# Patient Record
Sex: FEMALE | Race: WHITE
[De-identification: names, ages, dates, MRNs, and addresses within clinical notes are randomized per-mention and may not be internally consistent; named-entity substitution may affect disease eponyms.]

---

## 2024-08-23 ENCOUNTER — NON-APPOINTMENT (OUTPATIENT)
Age: 54
End: 2024-08-23

## 2024-08-23 ENCOUNTER — TRANSCRIPTION ENCOUNTER (OUTPATIENT)
Age: 54
End: 2024-08-23

## 2024-08-23 ENCOUNTER — APPOINTMENT (OUTPATIENT)
Dept: CARDIOLOGY | Facility: CLINIC | Age: 54
End: 2024-08-23
Payer: COMMERCIAL

## 2024-08-23 VITALS
HEART RATE: 84 BPM | SYSTOLIC BLOOD PRESSURE: 112 MMHG | BODY MASS INDEX: 35.7 KG/M2 | HEIGHT: 62 IN | DIASTOLIC BLOOD PRESSURE: 72 MMHG | OXYGEN SATURATION: 96 % | WEIGHT: 194 LBS

## 2024-08-23 DIAGNOSIS — B55.1: ICD-10-CM

## 2024-08-23 DIAGNOSIS — I10 ESSENTIAL (PRIMARY) HYPERTENSION: ICD-10-CM

## 2024-08-23 DIAGNOSIS — J35.1 HYPERTROPHY OF TONSILS: ICD-10-CM

## 2024-08-23 DIAGNOSIS — R06.09 OTHER FORMS OF DYSPNEA: ICD-10-CM

## 2024-08-23 DIAGNOSIS — E78.5 HYPERLIPIDEMIA, UNSPECIFIED: ICD-10-CM

## 2024-08-23 DIAGNOSIS — K76.9 LIVER DISEASE, UNSPECIFIED: ICD-10-CM

## 2024-08-23 PROCEDURE — 99204 OFFICE O/P NEW MOD 45 MIN: CPT

## 2024-08-23 PROCEDURE — 99214 OFFICE O/P EST MOD 30 MIN: CPT

## 2024-08-23 PROCEDURE — 93000 ELECTROCARDIOGRAM COMPLETE: CPT

## 2024-08-23 RX ORDER — URSODIOL 250 MG/1
250 TABLET ORAL DAILY
Refills: 0 | Status: ACTIVE | COMMUNITY

## 2024-08-23 RX ORDER — VENLAFAXINE HYDROCHLORIDE 75 MG/1
75 CAPSULE, EXTENDED RELEASE ORAL DAILY
Refills: 0 | Status: ACTIVE | COMMUNITY

## 2024-08-23 RX ORDER — AMLODIPINE AND OLMESARTAN MEDOXOMIL 10; 20 MG/1; MG/1
10-20 TABLET ORAL DAILY
Refills: 0 | Status: ACTIVE | COMMUNITY

## 2024-08-23 RX ORDER — METHYLPHENIDATE HYDROCHLORIDE 36 MG/1
36 TABLET, EXTENDED RELEASE ORAL DAILY
Refills: 0 | Status: ACTIVE | COMMUNITY

## 2024-08-23 RX ORDER — GUANFACINE 2 MG/1
2 TABLET, EXTENDED RELEASE ORAL DAILY
Refills: 0 | Status: ACTIVE | COMMUNITY

## 2024-08-23 RX ORDER — NALTREXONE HYDROCHLORIDE 50 MG/1
50 TABLET, FILM COATED ORAL DAILY
Refills: 0 | Status: ACTIVE | COMMUNITY

## 2024-08-23 RX ORDER — UBIDECARENONE/VIT E ACET 100MG-5
CAPSULE ORAL DAILY
Refills: 0 | Status: ACTIVE | COMMUNITY

## 2024-08-27 NOTE — HISTORY OF PRESENT ILLNESS
[FreeTextEntry1] : Patient very pleasant 54-year-old female known to me since age 15.  Initially she was seen for amarilys valve l prolapse and she is moved away and over the years has developed hypertension diagnosed in 2010 placed on amlodipine and olmesartan, she is known marked hyperlipidemia, and March blood work was notable for an LDL of 151 total cholesterol of 266 HDL of 97 triglycerides of 92.  She was placed on Huey recommended to initiate Crestor because of her liver disease that was not implemented.  Patient chronically has ADHD and is on Ritalin she has developed progressive liver disease is unclear etiology she had a biopsy 2 years ago and no pathology was found a FibroScan and MRI are pending.  She presents today concerned over new and progressive dyspnea on exertion despite the fact that she is lost some weight.  She also notes chronic fatigue wishes to have these issues evaluated as there is a strong family history of mother having CAD and bypass surgery in her 30s.

## 2024-08-27 NOTE — PHYSICAL EXAM
[Well Developed] : well developed [Well Nourished] : well nourished [No Acute Distress] : no acute distress [No Carotid Bruit] : no carotid bruit [Normal S1, S2] : normal S1, S2 [No Rub] : no rub [Clear Lung Fields] : clear lung fields [Good Air Entry] : good air entry [Normal] : normal gait [No Edema] : no edema [No Cyanosis] : no cyanosis [No Clubbing] : no clubbing [No Focal Deficits] : no focal deficits [de-identified] : Mildly overweight [de-identified] : No palpable thyroid [de-identified] : Soft midsystolic click and grade 1/6 to 2/6 apical MR murmur

## 2024-08-27 NOTE — CARDIOLOGY SUMMARY
[de-identified] : (8/23/2024) EKG: NSR at 77 beats minute with a frontal QRS axis of +15 degrees.  Borderline poor R progression otherwise normal trace.

## 2024-08-27 NOTE — REASON FOR VISIT
[FreeTextEntry1] : Patient is a 54-year-old female well-known to my practice since age 15 when she was diagnosed with mitral valve prolapse and having palpitations.  Over the years she has developed hypertension and has had significant weight gain as well as progressive liver disease from unclear etiology.  She presents to the office today with complaints of significant dyspnea on exertion with minimal exertion despite recent weight loss.  Patient otherwise presents looking and feeling well.  She is without any active anginal symptoms, she is postmenopausal but not clear that she has she has an IUD.  She notes that she has been losing weight and most recently while crossing a parking lot developed reproducible predictable dyspnea on exertion without anginal symptoms.  She has had an echocardiogram this past November that revealed mild LVH with a preserved ejection fraction and she presents today for recommendations and further evaluations, she is taking and tolerating all of her medications and blood pressure is acceptable at this time.

## 2024-08-27 NOTE — CARDIOLOGY SUMMARY
[de-identified] : (8/23/2024) EKG: NSR at 77 beats minute with a frontal QRS axis of +15 degrees.  Borderline poor R progression otherwise normal trace.

## 2024-08-27 NOTE — PHYSICAL EXAM
[Well Developed] : well developed [Well Nourished] : well nourished [No Acute Distress] : no acute distress [No Carotid Bruit] : no carotid bruit [Normal S1, S2] : normal S1, S2 [No Rub] : no rub [Clear Lung Fields] : clear lung fields [Good Air Entry] : good air entry [Normal] : normal gait [No Edema] : no edema [No Cyanosis] : no cyanosis [No Clubbing] : no clubbing [No Focal Deficits] : no focal deficits [de-identified] : Mildly overweight [de-identified] : No palpable thyroid [de-identified] : Soft midsystolic click and grade 1/6 to 2/6 apical MR murmur

## 2024-08-27 NOTE — REVIEW OF SYSTEMS
[Feeling Fatigued] : feeling fatigued [Weight Loss (___ Lbs)] : [unfilled] ~Ulb weight loss [SOB] : shortness of breath [Dyspnea on exertion] : dyspnea during exertion [Chest Discomfort] : no chest discomfort [Lower Ext Edema] : no extremity edema [Leg Claudication] : no intermittent leg claudication [Palpitations] : palpitations [Orthopnea] : no orthopnea [PND] : no PND [Syncope] : no syncope [Negative] : Neurological

## 2024-08-27 NOTE — DISCUSSION/SUMMARY
[FreeTextEntry1] : Patient very pleasant 54-year-old female well-known to me for the last nearly 40 years who has known mitral valve prolapse, strong family history of CAD, and undiagnosed type of liver disease that is not proven itself to be progressive.  She is currently awaiting a FibroScan and MRI as a biopsy was unrevealing.  Patient is chronically on Ritalin for ADHD and Huey.  She is taking and tolerating her antihypertensive regimen and her blood pressure is ideal.  She has recently lost a little bit of weight and has noticed significant and progressive dyspnea on exertion.  She had a recent workup this past November including echocardiography that was questionable for LVH with an EF of 55%.  Patient was recommended return back to the office for stress testing and likely will need to repeat her echocardiography if the stress test is unrevealing.  There is a strong family history of CAD and her lipids are less than ideal, she is unable to take Crestor because of her liver issues.  No additional changes were made to her medical regimen at this point in time.  She will return in the next several weeks for stress testing further management will be dependent on those findings.  A full set of updated laboratory data was recommended to be and a prescription issued.  Joel Goldberg, MD, FACC [EKG obtained to assist in diagnosis and management of assessed problem(s)] : EKG obtained to assist in diagnosis and management of assessed problem(s)

## 2024-09-12 ENCOUNTER — APPOINTMENT (OUTPATIENT)
Dept: CARDIOLOGY | Facility: CLINIC | Age: 54
End: 2024-09-12
Payer: COMMERCIAL

## 2024-09-12 VITALS
BODY MASS INDEX: 36.4 KG/M2 | HEART RATE: 99 BPM | DIASTOLIC BLOOD PRESSURE: 72 MMHG | WEIGHT: 199 LBS | SYSTOLIC BLOOD PRESSURE: 114 MMHG | OXYGEN SATURATION: 98 %

## 2024-09-12 DIAGNOSIS — K76.9 LIVER DISEASE, UNSPECIFIED: ICD-10-CM

## 2024-09-12 DIAGNOSIS — R06.09 OTHER FORMS OF DYSPNEA: ICD-10-CM

## 2024-09-12 DIAGNOSIS — I10 ESSENTIAL (PRIMARY) HYPERTENSION: ICD-10-CM

## 2024-09-12 DIAGNOSIS — E78.5 HYPERLIPIDEMIA, UNSPECIFIED: ICD-10-CM

## 2024-09-12 PROCEDURE — 93351 STRESS TTE COMPLETE: CPT

## 2024-09-12 PROCEDURE — 99213 OFFICE O/P EST LOW 20 MIN: CPT

## 2024-09-12 PROCEDURE — 93320 DOPPLER ECHO COMPLETE: CPT

## 2024-09-12 NOTE — REASON FOR VISIT
[FreeTextEntry1] : Patient is a 54-year-old female who presents the office today to review her stress echocardiogram performed for complaints of profound dyspnea on exertion which she described at her initial visit to the office here on August 23, 2024.  Patient is well-known to my practice since age 15 when she was diagnosed with mitral valve prolapse and was having palpitations. Over the years she has developed hypertension and has had significant weight gain as well as progressive liver disease of unclear etiology. She presents to the office today noting that she has had 2 more episodes of   of significant dyspnea on exertion with minimal exertion despite recent weight loss.  Patient otherwise presents looking and feeling well. She is without any active anginal symptoms, she is postmenopausal but not clear that she has she has an IUD. She notes that she has been losing weight and most recently while crossing a parking lot developed reproducible predictable dyspnea on exertion without anginal symptoms. She has had an echocardiogram this past November that revealed mild LVH with a preserved ejection fraction and she presents today for recommendations and further evaluations, she is taking and tolerat

## 2024-09-12 NOTE — HISTORY OF PRESENT ILLNESS
[FreeTextEntry1] : Patient very pleasant 54-year-old female known to me since age 15. Initially she was seen for amarilys valve lprolapse and she is moved away and over the years has developed hypertension diagnosed in 2010 placed on amlodipine and olmesartan, she is known marked hyperlipidemia, and March blood work was notable for an LDL of 151 total cholesterol of 266 HDL of 97 triglycerides of 92. She was placed on Huey recommended to initiate Crestor because of her liver disease that was not implemented.  Patient presents today to review her stress echo which was negative for ischemia, negative for exercise-induced arrhythmias, negative for a hypertensive response and had normal pulmonary artery pressures.  Patient chronically has ADHD and is on Ritalin she has developed progressive liver disease is unclear etiology she had a biopsy 2 years ago and no pathology was found a FibroScan and MRI are now back and appear to be stable but she is awaiting discussion with her hepatologist.  She presents today concerned over new and progressive dyspnea on exertion despite the fact that she is lost some weight. She also notes chronic fatigue wishes to have these issues evaluated as there is a strong family history of mother having CAD and bypass surgery in her 30s.  After discussion today she is convinced that the mechanical nature of her abdominal distention might be responsible for her dyspnea.

## 2024-09-12 NOTE — DISCUSSION/SUMMARY
[FreeTextEntry1] : Patient very pleasant 54-year-old female well-known to me for the last nearly 40 years who has known mitral valve prolapse, strong family history of CAD, and undiagnosed type of liver disease that is not proven itself to be progressive.  She presents today to review her stress echo which was negative for ischemia, arrhythmias, or hypertension.  There was also no evidence of chronotropic respons   Patient is chronically on Ritalin for ADHD and Huey.  She is taking and tolerating her antihypertensive regimen and her blood pressure is ideal.  She has recently lost a little bit of weight and has noticed significant and progressive dyspnea on exertion.  She had a recent workup this past November including echocardiography that was questionable for LVH with an EF of 55%.  At the time of her last visit she  was recommended return back to the office for stress testing which was performed today and was negative.  She was advised that there is no cardiovascular or pulmonary implication as to her dyspnea and reassured to the greatest extent possible.   No additional changes were made to her medical regimen at this point in time.  She will return to her doctors in [place] and recommend she pursue another explanation to her dyspnea.  Perhaps a pulmonary consult be in order otherwise stable from a cardiovascular point of view.  Patient was advised to return to the office semiannually or as needed.  Joel Goldberg, MD, FACC  Joel Goldberg, MD, FACC

## 2024-09-12 NOTE — CARDIOLOGY SUMMARY
[de-identified] : (8/23/2024) EKG: NSR at 77 beats minute with a frontal QRS axis of +15 degrees. Borderline poor R progression otherwise normal trace.  [de-identified] : (9/12/2024) STRESS ECHOCARDIOGRAPHIC CONCLUSIONS   Technically difficult image quality.  Resting baseline LV ejection fraction was was estimated at approximately 70% (normal EF).  Hyperdynamic LV function at rest. gross increase in LV function on technically difficult images. No obvious RWMA immediate post-stress.  Negative for exercise induced ischemia by EKG and echocardiogram. ________________________________________________________________________________________ Performance and Exam Tolerance   Protocol:  Standard Gerson   METS Achieved:  9.7    Stage Reached:  3  Exercise Duration:  8 min and 0 sec.    Heart Rate:  Rest: 88 bpm, Peak: 148 bpm (90 % max predicted)   HR Response:  Normal   Blood Pressure:  Rest: 108/68 mmHg, Peak: 162/82 mmHg.    BP Response:  Physiologic   Exercise Capacity:  Good   Pretest Chest Pain:   None   Angina:  No chest pain during exercise   Symptoms During Stress:  No symptoms   Reason for Termination:  Target heart rate achieved       ________________________________________________________________________________________      STRESS PROTOCOL STAGE DATA:  Time  HR (bpm) BP (mmHg)                                                                                                                                                                                                                                                         Stage Time HR (bpm) BP (mmHg) Comments                                                                                                                                 Treadmill Time Treadmill Speed (mph) Treadmill Grade HR (bpm) BP (mmHg)  Baseline   Standing   0.0%   88   108/68    3:00 min   1.7   10%   123   128/72    6:00 min   2.5   12%   138   146/76    8:00   3.4   14%   148   162    5:00   -------   -------   103   102/60      Stage Treadmill Time        Treadmill Speed (mph) Treadmill Grade HR (bpm) BP (mmHg)                        0     min                          min                          min                          min                          min                          min                          min                          min                          min                       min                       min                       min                       min                       min                   Treadmill Time Treadmill Speed (mph) Treadmill Grade HR (bpm) BP (mmHg)                           min                      min                      min                      min                      min                      min                      min                      min                      min                      min                      min                      min                   Other Medications Administered: Medication Amount Given Reason/Comment      ________________________________________________________________________________________ ELECTROCARDIOGRAPHIC FINDINGS:   Pre-Stress: At rest, the electrocardiogram revealed sinus rhythm with PRWP.     Post-Stress: At peak stress, the electrocardiogram revealed sinus tachycardia with no significant ischemic ST segment changes.  PAC during stress.  ________________________________________________________________________________________ FOCUSED ECHOCARDIOGRAPHIC FINDINGS:   Baseline Echocardiogram: Baseline/resting echocardiogram reveals hyperdynamic LV function.  At rest, the baseline left ventricular ejection fraction was was estimated at approximately 70% (normal EF).  Baseline PASP Findings:  Stress Echocardiogram: Stress echocardiogram reveals gross increase in LV function on technically difficult images. No obvious RWMA.  Stress PASP Findings: There was inadequate tricuspid regurgitation to estimate pulmonary artery systolic pressure pre-exercise.    PASP Oxygen Saturation: The oxygen saturation pre exercise was 98 %.  The oxygen saturation post exercise was 98 %.       Tricuspid Regurgitation / RVSP:  Stress  PASP: 23.0 mmHg